# Patient Record
Sex: FEMALE | Race: BLACK OR AFRICAN AMERICAN | NOT HISPANIC OR LATINO | URBAN - METROPOLITAN AREA
[De-identification: names, ages, dates, MRNs, and addresses within clinical notes are randomized per-mention and may not be internally consistent; named-entity substitution may affect disease eponyms.]

---

## 2021-01-04 ENCOUNTER — APPOINTMENT (OUTPATIENT)
Dept: GENERAL RADIOLOGY | Facility: HOSPITAL | Age: 46
End: 2021-01-04

## 2021-01-04 ENCOUNTER — HOSPITAL ENCOUNTER (EMERGENCY)
Facility: HOSPITAL | Age: 46
Discharge: HOME | End: 2021-01-04
Attending: EMERGENCY MEDICINE | Admitting: EMERGENCY MEDICINE

## 2021-01-04 VITALS
HEIGHT: 67 IN | WEIGHT: 190 LBS | OXYGEN SATURATION: 95 % | DIASTOLIC BLOOD PRESSURE: 86 MMHG | SYSTOLIC BLOOD PRESSURE: 122 MMHG | BODY MASS INDEX: 29.82 KG/M2 | RESPIRATION RATE: 16 BRPM | TEMPERATURE: 97.7 F | HEART RATE: 71 BPM

## 2021-01-04 DIAGNOSIS — R09.1 PLEURISY: Primary | ICD-10-CM

## 2021-01-04 DIAGNOSIS — R07.89 ACUTE CHEST WALL PAIN: ICD-10-CM

## 2021-01-04 LAB
ALBUMIN SERPL-MCNC: 4.1 G/DL (ref 3.5–5.2)
ALBUMIN/GLOB SERPL: 1.3 G/DL
ALP SERPL-CCNC: 87 U/L (ref 39–117)
ALT SERPL W P-5'-P-CCNC: 17 U/L (ref 1–33)
ANION GAP SERPL CALCULATED.3IONS-SCNC: 9 MMOL/L (ref 5–15)
AST SERPL-CCNC: 14 U/L (ref 1–32)
BASOPHILS # BLD AUTO: 0.06 10*3/MM3 (ref 0–0.2)
BASOPHILS NFR BLD AUTO: 0.6 % (ref 0–1.5)
BILIRUB SERPL-MCNC: 0.4 MG/DL (ref 0–1.2)
BUN SERPL-MCNC: 14 MG/DL (ref 6–20)
BUN/CREAT SERPL: 18.2 (ref 7–25)
CALCIUM SPEC-SCNC: 9.3 MG/DL (ref 8.6–10.5)
CHLORIDE SERPL-SCNC: 101 MMOL/L (ref 98–107)
CO2 SERPL-SCNC: 28 MMOL/L (ref 22–29)
CREAT SERPL-MCNC: 0.77 MG/DL (ref 0.57–1)
DEPRECATED RDW RBC AUTO: 47.8 FL (ref 37–54)
EOSINOPHIL # BLD AUTO: 0.16 10*3/MM3 (ref 0–0.4)
EOSINOPHIL NFR BLD AUTO: 1.7 % (ref 0.3–6.2)
ERYTHROCYTE [DISTWIDTH] IN BLOOD BY AUTOMATED COUNT: 14.7 % (ref 12.3–15.4)
GFR SERPL CREATININE-BSD FRML MDRD: 97 ML/MIN/1.73
GLOBULIN UR ELPH-MCNC: 3.1 GM/DL
GLUCOSE SERPL-MCNC: 88 MG/DL (ref 65–99)
HCT VFR BLD AUTO: 42.5 % (ref 34–46.6)
HGB BLD-MCNC: 13.4 G/DL (ref 12–15.9)
HOLD SPECIMEN: NORMAL
HOLD SPECIMEN: NORMAL
IMM GRANULOCYTES # BLD AUTO: 0.05 10*3/MM3 (ref 0–0.05)
IMM GRANULOCYTES NFR BLD AUTO: 0.5 % (ref 0–0.5)
LIPASE SERPL-CCNC: 15 U/L (ref 13–60)
LYMPHOCYTES # BLD AUTO: 3.27 10*3/MM3 (ref 0.7–3.1)
LYMPHOCYTES NFR BLD AUTO: 34.3 % (ref 19.6–45.3)
MCH RBC QN AUTO: 27.9 PG (ref 26.6–33)
MCHC RBC AUTO-ENTMCNC: 31.5 G/DL (ref 31.5–35.7)
MCV RBC AUTO: 88.5 FL (ref 79–97)
MONOCYTES # BLD AUTO: 0.53 10*3/MM3 (ref 0.1–0.9)
MONOCYTES NFR BLD AUTO: 5.6 % (ref 5–12)
NEUTROPHILS NFR BLD AUTO: 5.47 10*3/MM3 (ref 1.7–7)
NEUTROPHILS NFR BLD AUTO: 57.3 % (ref 42.7–76)
NRBC BLD AUTO-RTO: 0 /100 WBC (ref 0–0.2)
NT-PROBNP SERPL-MCNC: 14 PG/ML (ref 0–450)
PLATELET # BLD AUTO: 235 10*3/MM3 (ref 140–450)
PMV BLD AUTO: 10 FL (ref 6–12)
POTASSIUM SERPL-SCNC: 3.8 MMOL/L (ref 3.5–5.2)
PROT SERPL-MCNC: 7.2 G/DL (ref 6–8.5)
RBC # BLD AUTO: 4.8 10*6/MM3 (ref 3.77–5.28)
SODIUM SERPL-SCNC: 138 MMOL/L (ref 136–145)
TROPONIN T SERPL-MCNC: <0.01 NG/ML (ref 0–0.03)
TROPONIN T SERPL-MCNC: <0.01 NG/ML (ref 0–0.03)
WBC # BLD AUTO: 9.54 10*3/MM3 (ref 3.4–10.8)
WHOLE BLOOD HOLD SPECIMEN: NORMAL
WHOLE BLOOD HOLD SPECIMEN: NORMAL

## 2021-01-04 PROCEDURE — 99283 EMERGENCY DEPT VISIT LOW MDM: CPT

## 2021-01-04 PROCEDURE — 84484 ASSAY OF TROPONIN QUANT: CPT

## 2021-01-04 PROCEDURE — 84484 ASSAY OF TROPONIN QUANT: CPT | Performed by: EMERGENCY MEDICINE

## 2021-01-04 PROCEDURE — 85025 COMPLETE CBC W/AUTO DIFF WBC: CPT

## 2021-01-04 PROCEDURE — 25010000002 MORPHINE PER 10 MG: Performed by: EMERGENCY MEDICINE

## 2021-01-04 PROCEDURE — 83880 ASSAY OF NATRIURETIC PEPTIDE: CPT

## 2021-01-04 PROCEDURE — 36415 COLL VENOUS BLD VENIPUNCTURE: CPT

## 2021-01-04 PROCEDURE — 83690 ASSAY OF LIPASE: CPT

## 2021-01-04 PROCEDURE — 96374 THER/PROPH/DIAG INJ IV PUSH: CPT

## 2021-01-04 PROCEDURE — 80053 COMPREHEN METABOLIC PANEL: CPT

## 2021-01-04 PROCEDURE — 93005 ELECTROCARDIOGRAM TRACING: CPT

## 2021-01-04 PROCEDURE — 71045 X-RAY EXAM CHEST 1 VIEW: CPT

## 2021-01-04 PROCEDURE — 93005 ELECTROCARDIOGRAM TRACING: CPT | Performed by: EMERGENCY MEDICINE

## 2021-01-04 RX ORDER — ASPIRIN 81 MG/1
324 TABLET, CHEWABLE ORAL ONCE
Status: DISCONTINUED | OUTPATIENT
Start: 2021-01-04 | End: 2021-01-04 | Stop reason: HOSPADM

## 2021-01-04 RX ORDER — INDOMETHACIN 50 MG/1
50 CAPSULE ORAL
Qty: 15 CAPSULE | Refills: 0 | Status: SHIPPED | OUTPATIENT
Start: 2021-01-04 | End: 2021-01-09

## 2021-01-04 RX ORDER — SODIUM CHLORIDE 0.9 % (FLUSH) 0.9 %
10 SYRINGE (ML) INJECTION AS NEEDED
Status: DISCONTINUED | OUTPATIENT
Start: 2021-01-04 | End: 2021-01-04 | Stop reason: HOSPADM

## 2021-01-04 RX ORDER — VARENICLINE TARTRATE 0.5 MG/1
0.5 TABLET, FILM COATED ORAL 2 TIMES DAILY
COMMUNITY

## 2021-01-04 RX ORDER — HYDROCODONE BITARTRATE AND ACETAMINOPHEN 5; 325 MG/1; MG/1
1 TABLET ORAL EVERY 6 HOURS PRN
Qty: 12 TABLET | Refills: 0 | Status: SHIPPED | OUTPATIENT
Start: 2021-01-04

## 2021-01-04 RX ORDER — MORPHINE SULFATE 4 MG/ML
4 INJECTION, SOLUTION INTRAMUSCULAR; INTRAVENOUS ONCE
Status: COMPLETED | OUTPATIENT
Start: 2021-01-04 | End: 2021-01-04

## 2021-01-04 RX ADMIN — MORPHINE SULFATE 4 MG: 4 INJECTION, SOLUTION INTRAMUSCULAR; INTRAVENOUS at 18:44

## 2021-01-04 NOTE — ED PROVIDER NOTES
Subjective   45-year-old female presents with a complaint of chest pain.  The patient reports that she awoke this morning feeling fine.  At approxi-10 AM she began develop pain in the right chest that slowly moved to the center chest and eventually to the left chest.  She reports that the left chest pain has continued to persist for approximate the last 5 to 6 hours.  There is no radiation of pain outside of her left chest.  She reports the pain is exacerbated with deep breathing and with movement.  She reports no previous history of DVT or PE.  She thinks she does have a previous history of pleurisy.  She denies any recent trauma or injury to her chest.  She is not aware of any new or different activity where she may have strained the muscles of her chest, but she does do regular activity and this could have happened unknowingly.  He denies any acute respiratory symptom include no upper respiratory congestion, sore throat, rhinorrhea, cough, shortness of breath, change in sense of taste or smell.  No reported sick contacts.  No other reported aggravating, alleviating, or associated symptoms.          Review of Systems   Constitutional: Negative for chills, fatigue and fever.   HENT: Negative for congestion, ear pain, postnasal drip, sinus pressure and sore throat.    Eyes: Negative for pain, redness and visual disturbance.   Respiratory: Negative for cough, chest tightness and shortness of breath.    Cardiovascular: Positive for chest pain. Negative for palpitations and leg swelling.   Gastrointestinal: Negative for abdominal pain, anal bleeding, blood in stool, diarrhea, nausea and vomiting.   Endocrine: Negative for polydipsia and polyuria.   Genitourinary: Negative for difficulty urinating, dysuria, frequency and urgency.   Musculoskeletal: Negative for arthralgias, back pain and neck pain.   Skin: Negative for pallor and rash.   Allergic/Immunologic: Negative for environmental allergies and immunocompromised  state.   Neurological: Negative for dizziness, weakness and headaches.   Hematological: Negative for adenopathy.   Psychiatric/Behavioral: Negative for confusion, self-injury and suicidal ideas. The patient is not nervous/anxious.    All other systems reviewed and are negative.      Past Medical History:   Diagnosis Date   • Hypertension        No Known Allergies    History reviewed. No pertinent surgical history.    History reviewed. No pertinent family history.    Social History     Socioeconomic History   • Marital status: Single     Spouse name: Not on file   • Number of children: Not on file   • Years of education: Not on file   • Highest education level: Not on file   Tobacco Use   • Smoking status: Current Every Day Smoker     Packs/day: 0.50     Types: Cigarettes   • Tobacco comment: pt states she is going to start chantix   Substance and Sexual Activity   • Alcohol use: Yes     Comment: socially   • Drug use: Never   • Sexual activity: Defer           Objective   Physical Exam  Vitals signs and nursing note reviewed.   Constitutional:       General: She is not in acute distress.     Appearance: Normal appearance. She is well-developed. She is not toxic-appearing or diaphoretic.   HENT:      Head: Normocephalic and atraumatic.      Right Ear: External ear normal.      Left Ear: External ear normal.      Nose: Nose normal.   Eyes:      General: Lids are normal.      Pupils: Pupils are equal, round, and reactive to light.   Neck:      Musculoskeletal: Normal range of motion and neck supple.      Trachea: No tracheal deviation.   Cardiovascular:      Rate and Rhythm: Normal rate and regular rhythm.      Pulses: No decreased pulses.      Heart sounds: Normal heart sounds. No murmur. No friction rub. No gallop.    Pulmonary:      Effort: Accessory muscle usage present. No respiratory distress.      Breath sounds: Normal breath sounds. No decreased breath sounds, wheezing, rhonchi or rales.       Chest:      Chest  wall: Tenderness present. No mass, deformity, swelling, crepitus or edema.       Abdominal:      General: Bowel sounds are normal.      Palpations: Abdomen is soft.      Tenderness: There is no abdominal tenderness. There is no guarding or rebound.   Musculoskeletal: Normal range of motion.         General: No deformity.   Lymphadenopathy:      Cervical: No cervical adenopathy.   Skin:     General: Skin is warm and dry.      Findings: No rash.   Neurological:      Mental Status: She is alert and oriented to person, place, and time.      Cranial Nerves: No cranial nerve deficit.      Sensory: No sensory deficit.   Psychiatric:         Speech: Speech normal.         Behavior: Behavior normal.         Thought Content: Thought content normal.         Judgment: Judgment normal.         Procedures           ED Course                                           MDM  Number of Diagnoses or Management Options  Acute chest wall pain: new and requires workup  Pleurisy: new and requires workup  Diagnosis management comments: Chest x-ray and laboratory evaluation is normal.    Vital signs have remained normal.    Patient has pain that is exacerbated with deep breathing and to a mild degree with palpation over the chest.    Patient was discharged with indomethacin for treatment of pleurisy.  Discharged with Lortab to take as needed for more severe difficulty control pain.    Advised to follow-up with primary care physician for recheck within the next week.    JANET query complete. Treatment plan to include limited course of prescribed  controlled substance. Risks including addiction, benefits, and alternatives presented to patient.        Amount and/or Complexity of Data Reviewed  Clinical lab tests: ordered and reviewed  Tests in the radiology section of CPT®: ordered and reviewed  Review and summarize past medical records: yes  Independent visualization of images, tracings, or specimens: yes        Final diagnoses:   Pleurisy      Acute chest wall pain            Teresita Covington MD  01/04/21 2013

## 2021-01-05 LAB
QT INTERVAL: 372 MS
QT INTERVAL: 380 MS
QTC INTERVAL: 412 MS
QTC INTERVAL: 415 MS

## 2021-01-05 NOTE — DISCHARGE INSTRUCTIONS
Patient is advised to take indomethacin as prescribed.    Take Lortab as needed for more severe pain not controlled by indomethacin.    Follow-up with primary care physician for recheck in 1 week and return to the ER as needed.

## 2021-06-21 ENCOUNTER — TRANSCRIBE ORDERS (OUTPATIENT)
Dept: SPEECH THERAPY | Facility: HOSPITAL | Age: 46
End: 2021-06-21

## 2021-06-21 DIAGNOSIS — R41.3 MEMORY LOSS: Primary | ICD-10-CM

## 2021-07-07 ENCOUNTER — TELEPHONE (OUTPATIENT)
Dept: PHYSICAL THERAPY | Facility: CLINIC | Age: 46
End: 2021-07-07

## 2021-07-14 ENCOUNTER — OFFICE VISIT (OUTPATIENT)
Dept: PHYSICAL THERAPY | Facility: CLINIC | Age: 46
End: 2021-07-14

## 2021-07-14 DIAGNOSIS — R41.841 COGNITIVE COMMUNICATION DEFICIT: Primary | ICD-10-CM

## 2021-07-14 PROCEDURE — 96125 COGNITIVE TEST BY HC PRO: CPT | Performed by: SPEECH-LANGUAGE PATHOLOGIST

## 2021-07-15 NOTE — PROGRESS NOTES
Outpatient Speech Language Pathology   Adult Speech Language Cognitive Initial Evaluation       Patient Name: Kathrine Randall  : 1975  MRN: [id number]  Today's Date: 7/15/2021        Visit Date: 2021   There is no problem list on file for this patient.       Past Medical History:   Diagnosis Date   • Hypertension         No past surgical history on file.      Visit Dx:    ICD-10-CM ICD-9-CM   1. Cognitive communication deficit  R41.841 799.52           SLP SLC Evaluation - 21 1500        Communication Assessment/Intervention    Document Type  evaluation   -RB    Total Evaluation Minutes, SLP  45   -RB    Subjective Information  no complaints   -RB    Patient Observations  alert;cooperative;agree to therapy   -RB    Care Plan Review  evaluation/treatment results reviewed;patient/other agree to care plan   -RB    Patient Effort  good   -RB    Symptoms Noted During/After Treatment  none   -RB       General Information    Patient Profile Reviewed  yes   -RB    Pertinent History Of Current Problem  PT dx with COVID in November without hospitalization, but was out of work for 3 weeks. She went to the COVID clinic in [facility] who referred her here. She has noted brain fog, decreased attention, STM, recall, word finding, losing her train of thought, disorientation/loss of attention while driving. She feels symptoms have gotten worse. She also notes extreme fatigue, headaches, numbness, and tingling sensation in extremeties. Reports loss of taste and smell. Not currently vaccianted for COVID 19. 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 SHe gets distracted more easily and has troubel multi tasking. Feels her charting at work is more diffiuclt. She lives with 3 of her children. Also ntoes vision changes post COVID and reports floaters and at times peripheral flashes. SLP recommenede an eye doctor appointment as soon as possible and to go to the ED if flashes get worse. SLP  also gave information for neurologists and ENT.    -RB    Precautions/Limitations, Vision  WFL;for purposes of eval    notes some vision changes post COVID  -RB    Precautions/Limitations, Hearing  WFL;for purposes of eval   -RB    Patient Level of Education  not stated   -RB    Prior Level of Function-Communication  WFL   -RB    Plans/Goals Discussed with  patient;agreed upon   -RB    Barriers to Rehab  none identified   -RB    Patient's Goals for Discharge  functional cognition   -RB    Standardized Assessment Used  RBANS   -RB       Pain    Additional Documentation  Pain Scale: Numbers Pre/Post-Treatment (Group)   -RB       Pain Scale: Numbers Pre/Post-Treatment    Pretreatment Pain Rating  0/10 - no pain   -RB    Posttreatment Pain Rating  0/10 - no pain   -RB       Comprehension Assessment/Intervention    Comprehension Assessment/Intervention  Auditory Comprehension   -RB       Auditory Comprehension Assessment/Intervention    Auditory Comprehension (Communication)  WFL   -RB       Expression Assessment/Intervention    Expression Assessment/Intervention  verbal expression   -RB       Verbal Expression Assessment/Intervention    Verbal Expression  WFL   -RB       Oral Motor Structure and Function    Oral Motor Structure and Function  WFL   -RB    Dentition Assessment  upper dentures/partial in place;lower dentures/partial in place   -RB    Mucosal Quality  moist, healthy   -RB       Oral Musculature and Cranial Nerve Assessment    Oral Motor General Assessment  WFL   -RB       Motor Speech Assessment/Intervention    Motor Speech Function  WFL   -RB       Cursory Voice Assessment/Intervention    Quality and Resonance (Voice)  WFL   -RB       Cognitive Assessment Intervention- SLP    Cognitive Function (Cognition)  mild impairment   -RB    Orientation Status (Cognition)  WFL   -RB    Memory (Cognitive)  mild impairment;functional;immediate;short-term;delayed;auditory;visual;new learning   -RB    Attention  (Cognitive)  mild impairment;selective;alternating;divided;attention to detail;quiet environment;distracting environment;sustained   -RB    Thought Organization (Cognitive)  mild impairment;high level   -RB    Reasoning (Cognitive)  WFL;simple   -RB    Problem Solving (Cognitive)  WFL;simple   -RB    Executive Function (Cognition)  mild impairment;complex organization;home management activities   -RB    Pragmatics (Communication)  WFL   -RB    Cognition, Comment  PT demosntrates mild high level cog/comm deficits. PT's high level attention and STM deficits imapct home and work tasks. Pt's persistnent brain fog impacts attention and recall of information. Pt has deficits with complex management/organization and high level tought organization. would benefit from skilled ST     -RB       Standardized Tests    Cognitive/Memory Tests  RBANS: Repeatable Battery for the Assessment of Neuropsychological Status   -RB       RBANS- Repeatable Battery for the Assessment of Neuropsychological Status    Immediate Memory Index Score  100   -RB    Immediate Memory Qualitative Description  average   -RB    Visuospatial Index Score  126   -RB    Visuospatial Qualitative Description  average   -RB    Language Index Score  91   -RB    Language Qualitative Description  average   -RB    Attention Index Score  97   -RB    Attention Qualitative Description  average   -RB    Delayed Memory Index Score  100   -RB    Delayed Memory Qualitative Description  average   -RB    Total Index Score  514   -RB    RBANS Comments  though these scores are WFL, they are likely not the pt's baseline due to pt report and long haul COVID symptoms. Pt is demonstrating high level deficits. would benefit from skilled ST   -RB      User Key  (r) = Recorded By, (t) = Taken By, (c) = Cosigned By    Initials Name Provider Type    Jeannie Palacios SLP Speech and Language Pathologist         LTGs    Pt will be able to remember information needed to return to work  and function on work-related tasks at 90% with no cues    Pt and family will implement compensatory strategies to maximize patient’s memory function so patient can continue to participate in daily activities at 90% with no cues    STGs  Pt will utilize word finding strategies in conversation at 90% with no cues    Pt’s memory skills will be enhanced as reported by patient by utilizing internal memory strategies to recall up to 3 pieces of information after a 5 minute delay    Pt’s memory skills will be enhanced as reported by patient using external memory aides at 90% with no cues.  Pt will demonstrate improved ability to recall information by listening/reading information and answering  Questions at 90% with no cues     Pt will improve attention skills by sustaining focus to selective target/task when presented with competing stimuli or in a distracting environment in order to complete a task at 90% with no cues    Pt will improve attention skills by alternating or shifting focus between two different tasks in order to complete both tasks at 90% with no cues    Pt will improve attention skills by dividing focus and responding simultaneously to multiple tasks or in order to complete task at 90% with no cues    Recertification: 10/13/21              OP SLP Education     Row Name 07/15/21 0800       Education    Barriers to Learning  No barriers identified  -RB    Education Provided  Described results of evaluation;Patient expressed understanding of evaluation  -RB    Assessed  Learning needs;Learning motivation;Learning preferences;Learning readiness  -RB    Learning Motivation  Strong  -RB    Learning Method  Explanation;Demonstration;Teach back;Written materials  -RB    Teaching Response  Verbalized understanding;Demonstrated understanding;Reinforcement needed  -RB    Education Comments  HEP: cog/comm strategies, neurology, ENT and eye doctor information  -RB      User Key  (r) = Recorded By, (t) = Taken By, (c) =  Cosigned By    Initials Name Effective Dates    Jeannie Palacios SLP 06/16/21 -           OP SLP Assessment/Plan - 07/15/21 0800        SLP Assessment    Functional Problems  Speech Language- Adult/Cognition   -RB    Impact on Function: Adult Speech Language/Cognition  Restrictions in personal and social life;Poor attention to task;Trouble learning or remembering new information   -RB    Clinical Impression: Speech Language-Adult/Congnition  Mild:;Cognitive Communication Impairment   -RB    Functional Problems Comment  cog/comm deficits impacting daiyl tasks at home and tasks at work.    -RB    Clinical Impression Comments  would benefit from skilled ST    -RB    Please refer to paper survey for additional self-reported information  Yes   -RB    Please refer to items scanned into chart for additional diagnostic informaiton and handouts as provided by clinician  Yes   -RB    SLP Diagnosis  cog/comm deficit   -RB    Prognosis  Good (comment)   -RB    Patient/caregiver participated in establishment of treatment plan and goals  Yes   -RB    Patient would benefit from skilled therapy intervention  Yes   -RB       SLP Plan    Frequency  1x/weekly   -RB    Duration  10 weeks   -RB    Planned CPT's?  SLP INDIVIDUAL SPEECH THERAPY: 04092   -RB    Expected Duration of Therapy Session (SLP Eval)  45   -RB    Plan Comments  initiate POC   -RB      User Key  (r) = Recorded By, (t) = Taken By, (c) = Cosigned By    Initials Name Provider Type    Jeannie Palacios SLP Speech and Language Pathologist           SLP Outcome Measures (last 72 hours)      SLP Outcome Measures     Row Name 07/15/21 0800             SLP Outcome Measures    Outcome Measure Used?  Adult NOMS  -RB         Adult FCM Scores    FCM Chosen  Memory  -RB      Memory FCM Score  6  -RB        User Key  (r) = Recorded By, (t) = Taken By, (c) = Cosigned By    Initials Name Effective Dates    Jeannie Palacios SLP 06/16/21 -                  Jeannie Paiz MA  CCC-SLP, [facility]  7/15/2021

## 2021-08-04 ENCOUNTER — TREATMENT (OUTPATIENT)
Dept: PHYSICAL THERAPY | Facility: CLINIC | Age: 46
End: 2021-08-04

## 2021-08-04 DIAGNOSIS — R41.841 COGNITIVE COMMUNICATION DEFICIT: Primary | ICD-10-CM

## 2021-08-04 PROCEDURE — 92507 TX SP LANG VOICE COMM INDIV: CPT | Performed by: SPEECH-LANGUAGE PATHOLOGIST

## 2021-08-04 NOTE — PROGRESS NOTES
"Outpatient Speech Language Pathology   Adult Speech Language Cognitive Treatment Note       Patient Name: Kathrine Randall  : 1975  MRN: [id number]  Today's Date: 2021         Visit Date: 2021   There is no problem list on file for this patient.         Visit Dx:    ICD-10-CM ICD-9-CM   1. Cognitive communication deficit  R41.841 799.52   Pain: 0  Subjective: \"need to schedule with neurology\"   LTGs     Pt will be able to remember information needed to return to work and function on work-related tasks at 90% with no cues   -targeted work/home tasks: 85%. Discussed deficits in functional settings and how to address  Pt and family will implement compensatory strategies to maximize patient’s memory function so patient can continue to participate in daily activities at 90% with no cues   -modeled/targeted IM/compensatory strategies: 85% no cues. Appointment management: 90%, budgetin%, medicine management: 100%  STGs  Pt will utilize word finding strategies in conversation at 90% with no cues   -modeled SFA, provided visual aid  Pt’s memory skills will be enhanced as reported by patient by utilizing internal memory strategies to recall up to 3 pieces of information after a 5 minute delay   -name recall: 5/5 after 5 min delay  Pt’s memory skills will be enhanced as reported by patient using external memory aides at 90% with no cues.  Pt will demonstrate improved ability to recall information by listening/reading information and answering  Questions at 90% with no cues   -article recall: 80%   Pt will improve attention skills by sustaining focus to selective target/task when presented with competing stimuli or in a distracting environment in order to complete a task at 90% with no cues   -discussed/modeled attention strategies. 80%  Pt will improve attention skills by alternating or shifting focus between two different tasks in order to complete both tasks at 90% with no cues   -not targeted " today  Pt will improve attention skills by dividing focus and responding simultaneously to multiple tasks or in order to complete task at 90% with no cues    -not targeted today  Recertification: 10/13/21    OP SLP Assessment/Plan - 08/04/21 1300        SLP Assessment    Functional Problems  Speech Language- Adult/Cognition   -RB    Impact on Function: Adult Speech Language/Cognition  Restrictions in personal and social life;Poor attention to task;Trouble learning or remembering new information   -RB    Clinical Impression: Speech Language-Adult/Congnition  Mild:;Cognitive Communication Impairment   -RB    Functional Problems Comment  cog/comm deficits impacting daily home and work tasks   -RB    Clinical Impression Comments  receptive to tx and strategies presented. Following HEP,plans to schedule with neurology   -RB    Please refer to paper survey for additional self-reported information  Yes   -RB    Please refer to items scanned into chart for additional diagnostic informaiton and handouts as provided by clinician  Yes   -RB    SLP Diagnosis  cog/comm deficit   -RB    Prognosis  Good (comment)   -RB    Patient/caregiver participated in establishment of treatment plan and goals  Yes   -RB    Patient would benefit from skilled therapy intervention  Yes   -RB       SLP Plan    Frequency  1x/weekly   -RB    Duration  9 weeks   -RB    Planned CPT's?  SLP INDIVIDUAL SPEECH THERAPY: 27986   -RB    Expected Duration of Therapy Session (SLP Eval)  45   -RB    Plan Comments  continue POC   -RB      User Key  (r) = Recorded By, (t) = Taken By, (c) = Cosigned By    Initials Name Provider Type    Jeannie Palacios SLP Speech and Language Pathologist            OP SLP Education     Row Name 08/04/21 1300       Education    Barriers to Learning  No barriers identified  -RB    Education Provided  Patient demonstrated recommended strategies;Patient requires further education on strategies, risks  -RB    Assessed  Learning  needs;Learning motivation;Learning preferences;Learning readiness  -RB    Learning Motivation  Strong  -RB    Learning Method  Explanation;Demonstration;Teach back;Written materials  -RB    Teaching Response  Verbalized understanding;Demonstrated understanding;Reinforcement needed  -RB    Education Comments  HEP: association handout  -RB      User Key  (r) = Recorded By, (t) = Taken By, (c) = Cosigned By    Initials Name Effective Dates    Jeannie Palacios SLP 06/16/21 -           Jeannie Paiz MA CCC-AGUSTIN, [facility]  8/4/2021

## 2021-08-18 ENCOUNTER — TREATMENT (OUTPATIENT)
Dept: PHYSICAL THERAPY | Facility: CLINIC | Age: 46
End: 2021-08-18

## 2021-08-18 DIAGNOSIS — R41.841 COGNITIVE COMMUNICATION DEFICIT: Primary | ICD-10-CM

## 2021-08-18 PROCEDURE — 92507 TX SP LANG VOICE COMM INDIV: CPT | Performed by: SPEECH-LANGUAGE PATHOLOGIST

## 2021-08-18 NOTE — PROGRESS NOTES
"Outpatient Speech Language Pathology   Adult Speech Language Cognitive Progress Note       Patient Name: Kathrine Randall  : 1975  MRN: [id number]  Today's Date: 2021         Visit Date: 2021   There is no problem list on file for this patient.         Visit Dx:    ICD-10-CM ICD-9-CM   1. Cognitive communication deficit  R41.841 799.52     Pain: 0  Subjective: \"had car trouble yesterday\". PT reports trouble with appointment management  LTGs     Pt will be able to remember information needed to return to work and function on work-related tasks at 90% with no cues   -targeted work/home tasks: 85%. No cues  Pt and family will implement compensatory strategies to maximize patient’s memory function so patient can continue to participate in daily activities at 90% with no cues   -targeted home tasks: 80-85% no cues  STGs  Pt will utilize word finding strategies in conversation at 90% with no cues   -85% in conversation   Pt’s memory skills will be enhanced as reported by patient by utilizing internal memory strategies to recall up to 3 pieces of information after a 5 minute delay   -name recall: 4/4 after 5 min delay  -grocery list recall: 5/5 after 5 min delay   Pt’s memory skills will be enhanced as reported by patient using external memory aides at 90% with no cues.  -calendar task: 100% no cues  Pt will demonstrate improved ability to recall information by listening/reading information and answering  Questions at 90% with no cues   -article recall: 80%  -recipe recall: 100%  -invoice recall: 95%   -4 min podcast recall: 80% with note taking    Pt will improve attention skills by sustaining focus to selective target/task when presented with competing stimuli or in a distracting environment in order to complete a task at 90% with no cues   -targeted attention strategies. 80%  Pt will improve attention skills by alternating or shifting focus between two different tasks in order to complete both tasks at " 90% with no cues   -80% no cues  Pt will improve attention skills by dividing focus and responding simultaneously to multiple tasks or in order to complete task at 90% with no cues    -80% no cues  Recertification: 10/13/21  OP SLP Assessment/Plan - 08/18/21 0900        SLP Assessment    Functional Problems  Speech Language- Adult/Cognition   -RB    Impact on Function: Adult Speech Language/Cognition  Restrictions in personal and social life;Poor attention to task;Trouble learning or remembering new information   -RB    Clinical Impression: Speech Language-Adult/Congnition  Mild:;Cognitive Communication Impairment   -RB    Functional Problems Comment  cog/comm deficits impacting daiyl tasks at home and tasks at work.    -RB    Clinical Impression Comments  recpetive to tx and strategies, follows HEP, making gains with use of strategies   -RB    Please refer to paper survey for additional self-reported information  Yes   -RB    Please refer to items scanned into chart for additional diagnostic informaiton and handouts as provided by clinician  Yes   -RB    SLP Diagnosis  cog/comm deficit   -RB    Prognosis  Good (comment)   -RB    Patient/caregiver participated in establishment of treatment plan and goals  Yes   -RB    Patient would benefit from skilled therapy intervention  Yes   -RB       SLP Plan    Frequency  1x/weekly   -RB    Duration  8 weeks   -RB    Planned CPT's?  SLP INDIVIDUAL SPEECH THERAPY: 36776   -RB    Expected Duration of Therapy Session (SLP Eval)  45   -RB    Plan Comments  continue POC   -RB      User Key  (r) = Recorded By, (t) = Taken By, (c) = Cosigned By    Initials Name Provider Type    RB Jeannie Paiz SLP Speech and Language Pathologist            OP SLP Education     Row Name 08/18/21 0900       Education    Barriers to Learning  No barriers identified  -RB    Education Provided  Patient demonstrated recommended strategies;Patient requires further education on strategies, risks  -RB     Assessed  Learning needs;Learning motivation;Learning preferences;Learning readiness  -RB    Learning Motivation  Strong  -RB    Learning Method  Explanation;Demonstration;Teach back;Written materials  -RB    Teaching Response  Verbalized understanding;Demonstrated understanding;Reinforcement needed  -RB    Education Comments  HEP: grouping handout  -RB      User Key  (r) = Recorded By, (t) = Taken By, (c) = Cosigned By    Initials Name Effective Dates    Jeannie Palacios SLP 06/16/21 -            SLP Outcome Measures (last 72 hours)      SLP Outcome Measures     Row Name 08/18/21 0900             Adult FCM Scores    Memory FCM Score  6  -RB        User Key  (r) = Recorded By, (t) = Taken By, (c) = Cosigned By    Initials Name Effective Dates    Jeannie Palacios SLP 06/16/21 -               Jeannie Paiz MA CCC- SLP, [facility]  8/18/2021

## 2021-09-01 ENCOUNTER — TELEPHONE (OUTPATIENT)
Dept: PHYSICAL THERAPY | Facility: CLINIC | Age: 46
End: 2021-09-01

## 2021-09-22 ENCOUNTER — TREATMENT (OUTPATIENT)
Dept: PHYSICAL THERAPY | Facility: CLINIC | Age: 46
End: 2021-09-22

## 2021-09-22 DIAGNOSIS — R41.841 COGNITIVE COMMUNICATION DEFICIT: Primary | ICD-10-CM

## 2021-09-22 PROCEDURE — 92507 TX SP LANG VOICE COMM INDIV: CPT | Performed by: SPEECH-LANGUAGE PATHOLOGIST

## 2021-09-22 NOTE — PROGRESS NOTES
Outpatient Speech Language Pathology   Adult Speech Language Cognitive Progress Note       Patient Name: Kathrine Randall  : 1975  MRN: [id number]  Today's Date: 2021         Visit Date: 2021   There is no problem list on file for this patient.         Visit Dx:    ICD-10-CM ICD-9-CM   1. Cognitive communication deficit  R41.841 799.52     Pain: 0  Subjective: Pt reports having symptomatic COVID-19 a second time the past few weeks.   LTGs     Pt will be able to remember information needed to return to work and function on work-related tasks at 90% with no cues   -targeted work/home tasks: 85%. No cues  Pt and family will implement compensatory strategies to maximize patient’s memory function so patient can continue to participate in daily activities at 90% with no cues   -targeted home tasks: 80-85% no cues  STGs  Pt will utilize word finding strategies in conversation at 90% with no cues   -85% in conversation   Pt’s memory skills will be enhanced as reported by patient by utilizing internal memory strategies to recall up to 3 pieces of information after a 5 minute delay   -name recall: 5/5 after 5 min delay  -grocery list recall: 5/5 after 5 min delay   Pt’s memory skills will be enhanced as reported by patient using external memory aides at 90% with no cues.  -calendar task: 100% no cues  Pt will demonstrate improved ability to recall information by listening/reading information and answering  Questions at 90% with no cues   -article recall: 85% x2  -recipe recall with problem solvin%  -medical bill recall: 100%   -7 min podcast recall: 85% with note taking    Pt will improve attention skills by sustaining focus to selective target/task when presented with competing stimuli or in a distracting environment in order to complete a task at 90% with no cues   -targeted attention strategies. 80%  Pt will improve attention skills by alternating or shifting focus between two different tasks in  order to complete both tasks at 90% with no cues   -85% no cues  Pt will improve attention skills by dividing focus and responding simultaneously to multiple tasks or in order to complete task at 90% with no cues    -85% no cues  Recertification: 10/13/21           OP SLP Assessment/Plan - 08/       OP SLP Assessment/Plan - 09/22/21 1100        SLP Assessment    Functional Problems  Speech Language- Adult/Cognition   -RB    Impact on Function: Adult Speech Language/Cognition  Restrictions in personal and social life;Trouble learning or remembering new information;Poor attention to task   -RB    Clinical Impression: Speech Language-Adult/Congnition  Mild:;Cognitive Communication Impairment   -RB    Functional Problems Comment  cog/comm deficits impact work and home tasks   -RB    Clinical Impression Comments  recpetive to tx and strategies, follows HEP, making gains with use of strategies, pt recently had COVID a second time and was not able to come to tx since 8/18   -RB    Please refer to paper survey for additional self-reported information  Yes   -RB    Please refer to items scanned into chart for additional diagnostic informaiton and handouts as provided by clinician  Yes   -RB    SLP Diagnosis  cog/comm deficit   -RB    Prognosis  Good (comment)   -RB    Patient/caregiver participated in establishment of treatment plan and goals  Yes   -RB    Patient would benefit from skilled therapy intervention  Yes   -RB       SLP Plan    Frequency  1x/weekly   -RB    Duration  7 weeks   -RB    Planned CPT's?  SLP INDIVIDUAL SPEECH THERAPY: 98930   -RB    Expected Duration of Therapy Session (SLP Eval)  45   -RB    Plan Comments  continue POC   -RB      User Key  (r) = Recorded By, (t) = Taken By, (c) = Cosigned By    Initials Name Provider Type    Jeannie Palacios SLP Speech and Language Pathologist          OP SLP Education     Row Name 09/22/21 1100       Education    Barriers to Learning  No barriers identified  -RB     Education Provided  Patient demonstrated recommended strategies;Patient requires further education on strategies, risks  -RB    Assessed  Learning needs;Learning motivation;Learning preferences;Learning readiness  -RB    Learning Motivation  Strong  -RB    Learning Method  Explanation;Demonstration;Teach back;Written materials  -RB    Teaching Response  Verbalized understanding;Demonstrated understanding;Reinforcement needed  -RB    Education Comments  HEP: visualization handout  -RB      User Key  (r) = Recorded By, (t) = Taken By, (c) = Cosigned By    Initials Name Effective Dates    Jeannie Palacios SLP 06/16/21 -            SLP Outcome Measures (last 72 hours)      SLP Outcome Measures     Row Name 09/22/21 1100             Adult FCM Scores    Memory FCM Score  6  -RB        User Key  (r) = Recorded By, (t) = Taken By, (c) = Cosigned By    Initials Name Effective Dates    Jeannie Palacios SLP 06/16/21 -             Jeannie Paiz MA CCC-SLP, [facility]  9/22/2021

## 2022-01-07 ENCOUNTER — DOCUMENTATION (OUTPATIENT)
Dept: PHYSICAL THERAPY | Facility: CLINIC | Age: 47
End: 2022-01-07

## 2022-01-07 NOTE — PROGRESS NOTES
Speech Language Pathology Discharge Summary         Patient Name: Kathrine Randall  : 1975  MRN: [id number]    Today's Date: 2022          OP SLP Discharge Summary  Discharge Instructions: frequent cancels/ no shows. goals d/c. formal d/c note      Time Calculation:                    AGUSTIN Baker  2022

## 2022-07-28 ENCOUNTER — APPOINTMENT (OUTPATIENT)
Dept: GENERAL RADIOLOGY | Facility: HOSPITAL | Age: 47
End: 2022-07-28

## 2022-07-28 ENCOUNTER — HOSPITAL ENCOUNTER (EMERGENCY)
Facility: HOSPITAL | Age: 47
Discharge: HOME | End: 2022-07-28
Attending: STUDENT IN AN ORGANIZED HEALTH CARE EDUCATION/TRAINING PROGRAM | Admitting: STUDENT IN AN ORGANIZED HEALTH CARE EDUCATION/TRAINING PROGRAM

## 2022-07-28 VITALS
OXYGEN SATURATION: 96 % | HEART RATE: 84 BPM | TEMPERATURE: 97.6 F | BODY MASS INDEX: 36.37 KG/M2 | DIASTOLIC BLOOD PRESSURE: 82 MMHG | SYSTOLIC BLOOD PRESSURE: 104 MMHG | RESPIRATION RATE: 18 BRPM | HEIGHT: 68 IN | WEIGHT: 240 LBS

## 2022-07-28 DIAGNOSIS — M79.10 MYALGIA: Primary | ICD-10-CM

## 2022-07-28 DIAGNOSIS — R74.8 ELEVATED CK: ICD-10-CM

## 2022-07-28 DIAGNOSIS — E86.0 DEHYDRATION: ICD-10-CM

## 2022-07-28 LAB
ALBUMIN SERPL-MCNC: 4.1 G/DL (ref 3.5–5.2)
ALBUMIN/GLOB SERPL: 1.2 G/DL
ALP SERPL-CCNC: 92 U/L (ref 39–117)
ALT SERPL W P-5'-P-CCNC: 24 U/L (ref 1–33)
ANION GAP SERPL CALCULATED.3IONS-SCNC: 11 MMOL/L (ref 5–15)
AST SERPL-CCNC: 28 U/L (ref 1–32)
B PARAPERT DNA SPEC QL NAA+PROBE: NOT DETECTED
B PERT DNA SPEC QL NAA+PROBE: NOT DETECTED
BASOPHILS # BLD AUTO: 0.04 10*3/MM3 (ref 0–0.2)
BASOPHILS NFR BLD AUTO: 0.4 % (ref 0–1.5)
BILIRUB SERPL-MCNC: 0.3 MG/DL (ref 0–1.2)
BILIRUB UR QL STRIP: NEGATIVE
BUN SERPL-MCNC: 17 MG/DL (ref 6–20)
BUN/CREAT SERPL: 16.5 (ref 7–25)
C PNEUM DNA NPH QL NAA+NON-PROBE: NOT DETECTED
CALCIUM SPEC-SCNC: 9.9 MG/DL (ref 8.6–10.5)
CHLORIDE SERPL-SCNC: 100 MMOL/L (ref 98–107)
CK SERPL-CCNC: 306 U/L (ref 20–180)
CLARITY UR: CLEAR
CO2 SERPL-SCNC: 27 MMOL/L (ref 22–29)
COLOR UR: YELLOW
CREAT SERPL-MCNC: 1.03 MG/DL (ref 0.57–1)
CRP SERPL-MCNC: 1.14 MG/DL (ref 0–0.5)
D-LACTATE SERPL-SCNC: 0.8 MMOL/L (ref 0.5–2)
DEPRECATED RDW RBC AUTO: 46.2 FL (ref 37–54)
EGFRCR SERPLBLD CKD-EPI 2021: 66.8 ML/MIN/1.73
EOSINOPHIL # BLD AUTO: 0.21 10*3/MM3 (ref 0–0.4)
EOSINOPHIL NFR BLD AUTO: 1.9 % (ref 0.3–6.2)
ERYTHROCYTE [DISTWIDTH] IN BLOOD BY AUTOMATED COUNT: 14.5 % (ref 12.3–15.4)
FLUAV SUBTYP SPEC NAA+PROBE: NOT DETECTED
FLUAV SUBTYP SPEC NAA+PROBE: NOT DETECTED
FLUBV RNA ISLT QL NAA+PROBE: NOT DETECTED
FLUBV RNA ISLT QL NAA+PROBE: NOT DETECTED
GLOBULIN UR ELPH-MCNC: 3.4 GM/DL
GLUCOSE SERPL-MCNC: 100 MG/DL (ref 65–99)
GLUCOSE UR STRIP-MCNC: NEGATIVE MG/DL
HADV DNA SPEC NAA+PROBE: NOT DETECTED
HCOV 229E RNA SPEC QL NAA+PROBE: NOT DETECTED
HCOV HKU1 RNA SPEC QL NAA+PROBE: NOT DETECTED
HCOV NL63 RNA SPEC QL NAA+PROBE: NOT DETECTED
HCOV OC43 RNA SPEC QL NAA+PROBE: NOT DETECTED
HCT VFR BLD AUTO: 40.4 % (ref 34–46.6)
HETEROPH AB SER QL LA: NEGATIVE
HGB BLD-MCNC: 13.1 G/DL (ref 12–15.9)
HGB UR QL STRIP.AUTO: NEGATIVE
HMPV RNA NPH QL NAA+NON-PROBE: NOT DETECTED
HOLD SPECIMEN: NORMAL
HPIV1 RNA ISLT QL NAA+PROBE: NOT DETECTED
HPIV2 RNA SPEC QL NAA+PROBE: NOT DETECTED
HPIV3 RNA NPH QL NAA+PROBE: NOT DETECTED
HPIV4 P GENE NPH QL NAA+PROBE: NOT DETECTED
IMM GRANULOCYTES # BLD AUTO: 0.02 10*3/MM3 (ref 0–0.05)
IMM GRANULOCYTES NFR BLD AUTO: 0.2 % (ref 0–0.5)
KETONES UR QL STRIP: NEGATIVE
LEUKOCYTE ESTERASE UR QL STRIP.AUTO: NEGATIVE
LIPASE SERPL-CCNC: 21 U/L (ref 13–60)
LYMPHOCYTES # BLD AUTO: 3.52 10*3/MM3 (ref 0.7–3.1)
LYMPHOCYTES NFR BLD AUTO: 32 % (ref 19.6–45.3)
M PNEUMO IGG SER IA-ACNC: NOT DETECTED
MAGNESIUM SERPL-MCNC: 1.8 MG/DL (ref 1.6–2.6)
MCH RBC QN AUTO: 28.7 PG (ref 26.6–33)
MCHC RBC AUTO-ENTMCNC: 32.4 G/DL (ref 31.5–35.7)
MCV RBC AUTO: 88.4 FL (ref 79–97)
MONOCYTES # BLD AUTO: 0.71 10*3/MM3 (ref 0.1–0.9)
MONOCYTES NFR BLD AUTO: 6.5 % (ref 5–12)
NEUTROPHILS NFR BLD AUTO: 59 % (ref 42.7–76)
NEUTROPHILS NFR BLD AUTO: 6.49 10*3/MM3 (ref 1.7–7)
NITRITE UR QL STRIP: NEGATIVE
NRBC BLD AUTO-RTO: 0 /100 WBC (ref 0–0.2)
NT-PROBNP SERPL-MCNC: <5 PG/ML (ref 0–450)
PH UR STRIP.AUTO: 5.5 [PH] (ref 5–8)
PHOSPHATE SERPL-MCNC: 4.3 MG/DL (ref 2.5–4.5)
PLATELET # BLD AUTO: 252 10*3/MM3 (ref 140–450)
PMV BLD AUTO: 9.8 FL (ref 6–12)
POTASSIUM SERPL-SCNC: 4 MMOL/L (ref 3.5–5.2)
PROT SERPL-MCNC: 7.5 G/DL (ref 6–8.5)
PROT UR QL STRIP: NEGATIVE
RBC # BLD AUTO: 4.57 10*6/MM3 (ref 3.77–5.28)
RHINOVIRUS RNA SPEC NAA+PROBE: NOT DETECTED
RSV RNA NPH QL NAA+NON-PROBE: NOT DETECTED
SARS-COV-2 RNA NPH QL NAA+NON-PROBE: NOT DETECTED
SARS-COV-2 RNA PNL SPEC NAA+PROBE: NOT DETECTED
SODIUM SERPL-SCNC: 138 MMOL/L (ref 136–145)
SP GR UR STRIP: 1.01 (ref 1–1.03)
T4 FREE SERPL-MCNC: 1.05 NG/DL (ref 0.93–1.7)
TROPONIN T SERPL-MCNC: <0.01 NG/ML (ref 0–0.03)
TSH SERPL DL<=0.05 MIU/L-ACNC: 0.72 UIU/ML (ref 0.27–4.2)
UROBILINOGEN UR QL STRIP: NORMAL
WBC NRBC COR # BLD: 10.99 10*3/MM3 (ref 3.4–10.8)
WHOLE BLOOD HOLD COAG: NORMAL
WHOLE BLOOD HOLD SPECIMEN: NORMAL

## 2022-07-28 PROCEDURE — 83690 ASSAY OF LIPASE: CPT | Performed by: STUDENT IN AN ORGANIZED HEALTH CARE EDUCATION/TRAINING PROGRAM

## 2022-07-28 PROCEDURE — 93005 ELECTROCARDIOGRAM TRACING: CPT | Performed by: STUDENT IN AN ORGANIZED HEALTH CARE EDUCATION/TRAINING PROGRAM

## 2022-07-28 PROCEDURE — 86308 HETEROPHILE ANTIBODY SCREEN: CPT | Performed by: STUDENT IN AN ORGANIZED HEALTH CARE EDUCATION/TRAINING PROGRAM

## 2022-07-28 PROCEDURE — 99284 EMERGENCY DEPT VISIT MOD MDM: CPT

## 2022-07-28 PROCEDURE — 86140 C-REACTIVE PROTEIN: CPT | Performed by: STUDENT IN AN ORGANIZED HEALTH CARE EDUCATION/TRAINING PROGRAM

## 2022-07-28 PROCEDURE — 84439 ASSAY OF FREE THYROXINE: CPT | Performed by: STUDENT IN AN ORGANIZED HEALTH CARE EDUCATION/TRAINING PROGRAM

## 2022-07-28 PROCEDURE — 87636 SARSCOV2 & INF A&B AMP PRB: CPT

## 2022-07-28 PROCEDURE — 0202U NFCT DS 22 TRGT SARS-COV-2: CPT | Performed by: STUDENT IN AN ORGANIZED HEALTH CARE EDUCATION/TRAINING PROGRAM

## 2022-07-28 PROCEDURE — 83735 ASSAY OF MAGNESIUM: CPT | Performed by: STUDENT IN AN ORGANIZED HEALTH CARE EDUCATION/TRAINING PROGRAM

## 2022-07-28 PROCEDURE — 25010000002 DIPHENHYDRAMINE PER 50 MG: Performed by: STUDENT IN AN ORGANIZED HEALTH CARE EDUCATION/TRAINING PROGRAM

## 2022-07-28 PROCEDURE — 96361 HYDRATE IV INFUSION ADD-ON: CPT

## 2022-07-28 PROCEDURE — 80050 GENERAL HEALTH PANEL: CPT

## 2022-07-28 PROCEDURE — 25010000002 KETOROLAC TROMETHAMINE PER 15 MG: Performed by: STUDENT IN AN ORGANIZED HEALTH CARE EDUCATION/TRAINING PROGRAM

## 2022-07-28 PROCEDURE — 96375 TX/PRO/DX INJ NEW DRUG ADDON: CPT

## 2022-07-28 PROCEDURE — 36415 COLL VENOUS BLD VENIPUNCTURE: CPT

## 2022-07-28 PROCEDURE — 84484 ASSAY OF TROPONIN QUANT: CPT

## 2022-07-28 PROCEDURE — 83605 ASSAY OF LACTIC ACID: CPT | Performed by: STUDENT IN AN ORGANIZED HEALTH CARE EDUCATION/TRAINING PROGRAM

## 2022-07-28 PROCEDURE — 84100 ASSAY OF PHOSPHORUS: CPT | Performed by: STUDENT IN AN ORGANIZED HEALTH CARE EDUCATION/TRAINING PROGRAM

## 2022-07-28 PROCEDURE — 82550 ASSAY OF CK (CPK): CPT | Performed by: STUDENT IN AN ORGANIZED HEALTH CARE EDUCATION/TRAINING PROGRAM

## 2022-07-28 PROCEDURE — 25010000002 METOCLOPRAMIDE PER 10 MG: Performed by: STUDENT IN AN ORGANIZED HEALTH CARE EDUCATION/TRAINING PROGRAM

## 2022-07-28 PROCEDURE — 81003 URINALYSIS AUTO W/O SCOPE: CPT | Performed by: STUDENT IN AN ORGANIZED HEALTH CARE EDUCATION/TRAINING PROGRAM

## 2022-07-28 PROCEDURE — 71045 X-RAY EXAM CHEST 1 VIEW: CPT

## 2022-07-28 PROCEDURE — 93005 ELECTROCARDIOGRAM TRACING: CPT

## 2022-07-28 PROCEDURE — 96374 THER/PROPH/DIAG INJ IV PUSH: CPT

## 2022-07-28 PROCEDURE — 83880 ASSAY OF NATRIURETIC PEPTIDE: CPT

## 2022-07-28 RX ORDER — KETOROLAC TROMETHAMINE 15 MG/ML
15 INJECTION, SOLUTION INTRAMUSCULAR; INTRAVENOUS ONCE
Status: COMPLETED | OUTPATIENT
Start: 2022-07-28 | End: 2022-07-28

## 2022-07-28 RX ORDER — SODIUM CHLORIDE 0.9 % (FLUSH) 0.9 %
10 SYRINGE (ML) INJECTION AS NEEDED
Status: DISCONTINUED | OUTPATIENT
Start: 2022-07-28 | End: 2022-07-28 | Stop reason: HOSPADM

## 2022-07-28 RX ORDER — DIPHENHYDRAMINE HYDROCHLORIDE 50 MG/ML
25 INJECTION INTRAMUSCULAR; INTRAVENOUS ONCE
Status: COMPLETED | OUTPATIENT
Start: 2022-07-28 | End: 2022-07-28

## 2022-07-28 RX ORDER — ONDANSETRON 4 MG/1
4 TABLET, ORALLY DISINTEGRATING ORAL 4 TIMES DAILY PRN
Qty: 12 TABLET | Refills: 0 | Status: SHIPPED | OUTPATIENT
Start: 2022-07-28

## 2022-07-28 RX ORDER — ACETAMINOPHEN 500 MG
1000 TABLET ORAL ONCE
Status: COMPLETED | OUTPATIENT
Start: 2022-07-28 | End: 2022-07-28

## 2022-07-28 RX ORDER — METOCLOPRAMIDE HYDROCHLORIDE 5 MG/ML
5 INJECTION INTRAMUSCULAR; INTRAVENOUS ONCE
Status: COMPLETED | OUTPATIENT
Start: 2022-07-28 | End: 2022-07-28

## 2022-07-28 RX ADMIN — DIPHENHYDRAMINE HYDROCHLORIDE 25 MG: 50 INJECTION, SOLUTION INTRAMUSCULAR; INTRAVENOUS at 03:52

## 2022-07-28 RX ADMIN — SODIUM CHLORIDE, POTASSIUM CHLORIDE, SODIUM LACTATE AND CALCIUM CHLORIDE 1000 ML: 600; 310; 30; 20 INJECTION, SOLUTION INTRAVENOUS at 03:47

## 2022-07-28 RX ADMIN — ACETAMINOPHEN 1000 MG: 500 TABLET, FILM COATED ORAL at 03:51

## 2022-07-28 RX ADMIN — METOCLOPRAMIDE 5 MG: 5 INJECTION, SOLUTION INTRAMUSCULAR; INTRAVENOUS at 03:52

## 2022-07-28 RX ADMIN — KETOROLAC TROMETHAMINE 15 MG: 15 INJECTION, SOLUTION INTRAMUSCULAR; INTRAVENOUS at 03:51

## 2022-07-29 LAB
QT INTERVAL: 338 MS
QTC INTERVAL: 427 MS

## 2023-08-20 ENCOUNTER — APPOINTMENT (OUTPATIENT)
Dept: GENERAL RADIOLOGY | Facility: HOSPITAL | Age: 48
End: 2023-08-20
Payer: COMMERCIAL

## 2023-08-20 ENCOUNTER — HOSPITAL ENCOUNTER (EMERGENCY)
Facility: HOSPITAL | Age: 48
Discharge: HOME | End: 2023-08-20
Attending: EMERGENCY MEDICINE | Admitting: EMERGENCY MEDICINE
Payer: COMMERCIAL

## 2023-08-20 VITALS
HEART RATE: 99 BPM | WEIGHT: 250 LBS | BODY MASS INDEX: 39.24 KG/M2 | DIASTOLIC BLOOD PRESSURE: 69 MMHG | SYSTOLIC BLOOD PRESSURE: 108 MMHG | TEMPERATURE: 98.2 F | HEIGHT: 67 IN | RESPIRATION RATE: 16 BRPM | OXYGEN SATURATION: 100 %

## 2023-08-20 DIAGNOSIS — S00.531A CONTUSION OF LIP, INITIAL ENCOUNTER: ICD-10-CM

## 2023-08-20 DIAGNOSIS — S70.12XA CONTUSION OF LEFT THIGH, INITIAL ENCOUNTER: ICD-10-CM
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
## 2023-08-20 DIAGNOSIS — M53.3 COCCYGODYNIA: Primary | ICD-10-CM

## 2023-08-20 DIAGNOSIS — S80.819A ABRASION OF LOWER EXTREMITY, UNSPECIFIED LATERALITY, INITIAL ENCOUNTER: ICD-10-CM

## 2023-08-20 PROCEDURE — 25010000002 KETOROLAC TROMETHAMINE PER 15 MG: Performed by: EMERGENCY MEDICINE

## 2023-08-20 PROCEDURE — 99283 EMERGENCY DEPT VISIT LOW MDM: CPT

## 2023-08-20 PROCEDURE — 63710000001 ONDANSETRON ODT 4 MG TABLET DISPERSIBLE: Performed by: EMERGENCY MEDICINE

## 2023-08-20 PROCEDURE — 96372 THER/PROPH/DIAG INJ SC/IM: CPT

## 2023-08-20 PROCEDURE — 90715 TDAP VACCINE 7 YRS/> IM: CPT | Performed by: EMERGENCY MEDICINE

## 2023-08-20 PROCEDURE — 90471 IMMUNIZATION ADMIN: CPT | Performed by: EMERGENCY MEDICINE

## 2023-08-20 PROCEDURE — 72220 X-RAY EXAM SACRUM TAILBONE: CPT

## 2023-08-20 PROCEDURE — 25010000002 TETANUS-DIPHTH-ACELL PERTUSSIS 5-2.5-18.5 LF-MCG/0.5 SUSPENSION PREFILLED SYRINGE: Performed by: EMERGENCY MEDICINE

## 2023-08-20 PROCEDURE — 25010000002 MORPHINE PER 10 MG: Performed by: EMERGENCY MEDICINE

## 2023-08-20 RX ORDER — IBUPROFEN 600 MG/1
600 TABLET ORAL EVERY 6 HOURS PRN
Qty: 30 TABLET | Refills: 0 | Status: SHIPPED | OUTPATIENT
Start: 2023-08-20

## 2023-08-20 RX ORDER — OXYCODONE HYDROCHLORIDE AND ACETAMINOPHEN 5; 325 MG/1; MG/1
1 TABLET ORAL ONCE
Status: COMPLETED | OUTPATIENT
Start: 2023-08-20 | End: 2023-08-20

## 2023-08-20 RX ORDER — AMOXICILLIN 250 MG
2 CAPSULE ORAL NIGHTLY PRN
Qty: 14 TABLET | Refills: 0 | Status: SHIPPED | OUTPATIENT
Start: 2023-08-20 | End: 2023-08-27

## 2023-08-20 RX ORDER — MORPHINE SULFATE 4 MG/ML
4 INJECTION, SOLUTION INTRAMUSCULAR; INTRAVENOUS ONCE
Status: COMPLETED | OUTPATIENT
Start: 2023-08-20 | End: 2023-08-20

## 2023-08-20 RX ORDER — LIDOCAINE 50 MG/G
1 PATCH TOPICAL EVERY 24 HOURS
Qty: 30 EACH | Refills: 0 | Status: SHIPPED | OUTPATIENT
Start: 2023-08-20

## 2023-08-20 RX ORDER — KETOROLAC TROMETHAMINE 30 MG/ML
60 INJECTION, SOLUTION INTRAMUSCULAR; INTRAVENOUS ONCE
Status: COMPLETED | OUTPATIENT
Start: 2023-08-20 | End: 2023-08-20

## 2023-08-20 RX ORDER — LIDOCAINE 50 MG/G
1 PATCH TOPICAL ONCE
Status: DISCONTINUED | OUTPATIENT
Start: 2023-08-20 | End: 2023-08-20 | Stop reason: HOSPADM

## 2023-08-20 RX ORDER — OXYCODONE HYDROCHLORIDE AND ACETAMINOPHEN 5; 325 MG/1; MG/1
1 TABLET ORAL EVERY 4 HOURS PRN
Qty: 15 TABLET | Refills: 0 | Status: SHIPPED | OUTPATIENT
Start: 2023-08-20

## 2023-08-20 RX ORDER — ONDANSETRON 4 MG/1
4 TABLET, ORALLY DISINTEGRATING ORAL EVERY 6 HOURS PRN
Qty: 12 TABLET | Refills: 0 | Status: SHIPPED | OUTPATIENT
Start: 2023-08-20

## 2023-08-20 RX ORDER — ONDANSETRON 4 MG/1
4 TABLET, ORALLY DISINTEGRATING ORAL ONCE
Status: COMPLETED | OUTPATIENT
Start: 2023-08-20 | End: 2023-08-20

## 2023-08-20 RX ADMIN — MORPHINE SULFATE 4 MG: 4 INJECTION, SOLUTION INTRAMUSCULAR; INTRAVENOUS at 08:41

## 2023-08-20 RX ADMIN — ONDANSETRON 4 MG: 4 TABLET, ORALLY DISINTEGRATING ORAL at 06:40

## 2023-08-20 RX ADMIN — OXYCODONE HYDROCHLORIDE AND ACETAMINOPHEN 1 TABLET: 5; 325 TABLET ORAL at 06:39

## 2023-08-20 RX ADMIN — KETOROLAC TROMETHAMINE 60 MG: 30 INJECTION, SOLUTION INTRAMUSCULAR; INTRAVENOUS at 08:40

## 2023-08-20 RX ADMIN — LIDOCAINE 1 PATCH: 50 PATCH TOPICAL at 08:40

## 2023-08-20 RX ADMIN — TETANUS TOXOID, REDUCED DIPHTHERIA TOXOID AND ACELLULAR PERTUSSIS VACCINE, ADSORBED 0.5 ML: 5; 2.5; 8; 8; 2.5 SUSPENSION INTRAMUSCULAR at 07:00
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
## 2023-08-20 NOTE — DISCHARGE INSTRUCTIONS
Try to find a donut pillow or hemorrhoid pillow to use to help alleviate the pain while you are sitting.

## 2023-08-20 NOTE — Clinical Note
[facility] EMERGENCY DEPARTMENT  [address]  [facility] [address]  Phone: [phone]    Kathrine Randall was seen and treated in our emergency department on 8/20/2023.  She may return to work on 08/23/2023.         Thank you for choosing [facility].    Bambi Blanco MD

## 2023-11-09 ENCOUNTER — TRANSCRIBE ORDERS (OUTPATIENT)
Dept: LAB | Facility: HOSPITAL | Age: 48
End: 2023-11-09
Payer: COMMERCIAL

## 2023-11-09 ENCOUNTER — LAB (OUTPATIENT)
Dept: LAB | Facility: HOSPITAL | Age: 48
End: 2023-11-09
Payer: COMMERCIAL

## 2023-11-09 DIAGNOSIS — Z11.3 SCREENING EXAMINATION FOR VENEREAL DISEASE: ICD-10-CM

## 2023-11-09 DIAGNOSIS — Z11.3 SCREENING EXAMINATION FOR VENEREAL DISEASE: Primary | ICD-10-CM

## 2023-11-09 DIAGNOSIS — R23.2 FLUSHING: ICD-10-CM

## 2023-11-09 LAB
25(OH)D3 SERPL-MCNC: 9.5 NG/ML (ref 30–100)
DEPRECATED RDW RBC AUTO: 42 FL (ref 37–54)
ERYTHROCYTE [DISTWIDTH] IN BLOOD BY AUTOMATED COUNT: 13.9 % (ref 12.3–15.4)
FSH SERPL-ACNC: 62.7 MIU/ML
HBV SURFACE AG SERPL QL IA: NORMAL
HCT VFR BLD AUTO: 41.9 % (ref 34–46.6)
HCV AB SER DONR QL: NORMAL
HGB BLD-MCNC: 14.5 G/DL (ref 12–15.9)
HIV 1+2 AB+HIV1 P24 AG SERPL QL IA: NORMAL
MCH RBC QN AUTO: 28.8 PG (ref 26.6–33)
MCHC RBC AUTO-ENTMCNC: 34.6 G/DL (ref 31.5–35.7)
MCV RBC AUTO: 83.3 FL (ref 79–97)
PLATELET # BLD AUTO: 259 10*3/MM3 (ref 140–450)
PMV BLD AUTO: 10.6 FL (ref 6–12)
RBC # BLD AUTO: 5.03 10*6/MM3 (ref 3.77–5.28)
TSH SERPL DL<=0.05 MIU/L-ACNC: 0.59 UIU/ML (ref 0.27–4.2)
WBC NRBC COR # BLD: 8.46 10*3/MM3 (ref 3.4–10.8)

## 2023-11-09 PROCEDURE — 86780 TREPONEMA PALLIDUM: CPT

## 2023-11-09 PROCEDURE — 83001 ASSAY OF GONADOTROPIN (FSH): CPT

## 2023-11-09 PROCEDURE — 84443 ASSAY THYROID STIM HORMONE: CPT

## 2023-11-09 PROCEDURE — 87340 HEPATITIS B SURFACE AG IA: CPT

## 2023-11-09 PROCEDURE — G0432 EIA HIV-1/HIV-2 SCREEN: HCPCS

## 2023-11-09 PROCEDURE — 36415 COLL VENOUS BLD VENIPUNCTURE: CPT

## 2023-11-09 PROCEDURE — 82306 VITAMIN D 25 HYDROXY: CPT | Performed by: NURSE PRACTITIONER

## 2023-11-09 PROCEDURE — 85027 COMPLETE CBC AUTOMATED: CPT

## 2023-11-09 PROCEDURE — 86803 HEPATITIS C AB TEST: CPT

## 2023-11-10 LAB — TREPONEMA PALLIDUM IGG+IGM AB [PRESENCE] IN SERUM OR PLASMA BY IMMUNOASSAY: NON REACTIVE

## 2024-11-11 ENCOUNTER — LAB (OUTPATIENT)
Dept: LAB | Facility: HOSPITAL | Age: 49
End: 2024-11-11
Payer: COMMERCIAL

## 2024-11-11 ENCOUNTER — TRANSCRIBE ORDERS (OUTPATIENT)
Dept: LAB | Facility: HOSPITAL | Age: 49
End: 2024-11-11
Payer: COMMERCIAL

## 2024-11-11 DIAGNOSIS — R68.82 DECREASED LIBIDO: ICD-10-CM

## 2024-11-11 DIAGNOSIS — R35.0 URINARY FREQUENCY: ICD-10-CM

## 2024-11-11 DIAGNOSIS — Z11.3 SCREENING EXAMINATION FOR VENEREAL DISEASE: ICD-10-CM

## 2024-11-11 DIAGNOSIS — R35.0 URINARY FREQUENCY: Primary | ICD-10-CM

## 2024-11-11 DIAGNOSIS — Z01.419 ROUTINE GYNECOLOGICAL EXAMINATION: ICD-10-CM

## 2024-11-11 LAB
ALBUMIN SERPL-MCNC: 4.2 G/DL (ref 3.5–5.2)
ALBUMIN/GLOB SERPL: 1.4 G/DL
ALP SERPL-CCNC: 91 U/L (ref 39–117)
ALT SERPL W P-5'-P-CCNC: 28 U/L (ref 1–33)
ANION GAP SERPL CALCULATED.3IONS-SCNC: 11 MMOL/L (ref 5–15)
AST SERPL-CCNC: 31 U/L (ref 1–32)
BILIRUB SERPL-MCNC: 0.5 MG/DL (ref 0–1.2)
BUN SERPL-MCNC: 13 MG/DL (ref 6–20)
BUN/CREAT SERPL: 13.5 (ref 7–25)
CALCIUM SPEC-SCNC: 9.8 MG/DL (ref 8.6–10.5)
CHLORIDE SERPL-SCNC: 102 MMOL/L (ref 98–107)
CO2 SERPL-SCNC: 25 MMOL/L (ref 22–29)
CREAT SERPL-MCNC: 0.96 MG/DL (ref 0.57–1)
EGFRCR SERPLBLD CKD-EPI 2021: 71.8 ML/MIN/1.73
GLOBULIN UR ELPH-MCNC: 2.9 GM/DL
GLUCOSE SERPL-MCNC: 92 MG/DL (ref 65–99)
POTASSIUM SERPL-SCNC: 4.4 MMOL/L (ref 3.5–5.2)
PROT SERPL-MCNC: 7.1 G/DL (ref 6–8.5)
SODIUM SERPL-SCNC: 138 MMOL/L (ref 136–145)

## 2024-11-11 PROCEDURE — G0432 EIA HIV-1/HIV-2 SCREEN: HCPCS

## 2024-11-11 PROCEDURE — 86780 TREPONEMA PALLIDUM: CPT

## 2024-11-11 PROCEDURE — 83036 HEMOGLOBIN GLYCOSYLATED A1C: CPT

## 2024-11-11 PROCEDURE — 80061 LIPID PANEL: CPT

## 2024-11-11 PROCEDURE — 86803 HEPATITIS C AB TEST: CPT

## 2024-11-11 PROCEDURE — 84402 ASSAY OF FREE TESTOSTERONE: CPT

## 2024-11-11 PROCEDURE — 80050 GENERAL HEALTH PANEL: CPT | Performed by: NURSE PRACTITIONER

## 2024-11-11 PROCEDURE — 82306 VITAMIN D 25 HYDROXY: CPT | Performed by: NURSE PRACTITIONER

## 2024-11-11 PROCEDURE — 36415 COLL VENOUS BLD VENIPUNCTURE: CPT

## 2024-11-11 PROCEDURE — 84403 ASSAY OF TOTAL TESTOSTERONE: CPT

## 2024-11-11 PROCEDURE — 87340 HEPATITIS B SURFACE AG IA: CPT

## 2024-11-11 PROCEDURE — 87086 URINE CULTURE/COLONY COUNT: CPT

## 2024-11-12 LAB
25(OH)D3 SERPL-MCNC: 16.2 NG/ML (ref 30–100)
CHOLEST SERPL-MCNC: 275 MG/DL (ref 0–200)
DEPRECATED RDW RBC AUTO: 45.4 FL (ref 37–54)
ERYTHROCYTE [DISTWIDTH] IN BLOOD BY AUTOMATED COUNT: 14.5 % (ref 12.3–15.4)
HBA1C MFR BLD: 5.9 % (ref 4.8–5.6)
HBV SURFACE AG SERPL QL IA: NORMAL
HCT VFR BLD AUTO: 41.8 % (ref 34–46.6)
HCV AB SER QL: NORMAL
HDLC SERPL QL: 5.73
HDLC SERPL-MCNC: 48 MG/DL (ref 40–60)
HGB BLD-MCNC: 14.3 G/DL (ref 12–15.9)
HIV 1+2 AB+HIV1 P24 AG SERPL QL IA: NORMAL
LDLC SERPL CALC-MCNC: 210 MG/DL (ref 0–100)
MCH RBC QN AUTO: 29.7 PG (ref 26.6–33)
MCHC RBC AUTO-ENTMCNC: 34.2 G/DL (ref 31.5–35.7)
MCV RBC AUTO: 86.7 FL (ref 79–97)
PLATELET # BLD AUTO: 239 10*3/MM3 (ref 140–450)
PMV BLD AUTO: 10.2 FL (ref 6–12)
RBC # BLD AUTO: 4.82 10*6/MM3 (ref 3.77–5.28)
TREPONEMA PALLIDUM IGG+IGM AB [PRESENCE] IN SERUM OR PLASMA BY IMMUNOASSAY: NORMAL
TRIGL SERPL-MCNC: 99 MG/DL (ref 0–150)
TSH SERPL DL<=0.05 MIU/L-ACNC: 0.5 UIU/ML (ref 0.27–4.2)
VLDLC SERPL-MCNC: 17 MG/DL (ref 5–40)
WBC NRBC COR # BLD AUTO: 9.59 10*3/MM3 (ref 3.4–10.8)

## 2024-11-13 LAB — BACTERIA SPEC AEROBE CULT: NO GROWTH

## 2024-11-16 LAB
TESTOST FREE SERPL-MCNC: 1.7 PG/ML (ref 0–4.2)
TESTOST SERPL-MCNC: 39 NG/DL (ref 4–50)